# Patient Record
Sex: FEMALE | Race: WHITE | NOT HISPANIC OR LATINO | Employment: FULL TIME | URBAN - NONMETROPOLITAN AREA
[De-identification: names, ages, dates, MRNs, and addresses within clinical notes are randomized per-mention and may not be internally consistent; named-entity substitution may affect disease eponyms.]

---

## 2018-11-02 ENCOUNTER — OFFICE VISIT (OUTPATIENT)
Dept: FAMILY MEDICINE CLINIC | Facility: CLINIC | Age: 48
End: 2018-11-02

## 2018-11-02 VITALS
OXYGEN SATURATION: 98 % | SYSTOLIC BLOOD PRESSURE: 110 MMHG | HEIGHT: 70 IN | TEMPERATURE: 98.1 F | WEIGHT: 167 LBS | BODY MASS INDEX: 23.91 KG/M2 | DIASTOLIC BLOOD PRESSURE: 76 MMHG | HEART RATE: 79 BPM

## 2018-11-02 DIAGNOSIS — R14.2 BELCHING: Primary | ICD-10-CM

## 2018-11-02 DIAGNOSIS — R82.90 ABNORMAL URINALYSIS: ICD-10-CM

## 2018-11-02 DIAGNOSIS — K21.9 GASTROESOPHAGEAL REFLUX DISEASE, ESOPHAGITIS PRESENCE NOT SPECIFIED: ICD-10-CM

## 2018-11-02 DIAGNOSIS — R14.0 ABDOMINAL BLOATING: ICD-10-CM

## 2018-11-02 DIAGNOSIS — R21 RASH: ICD-10-CM

## 2018-11-02 LAB
B-HCG UR QL: NEGATIVE
BILIRUB BLD-MCNC: NEGATIVE MG/DL
CLARITY, POC: ABNORMAL
COLOR UR: ABNORMAL
GLUCOSE UR STRIP-MCNC: NEGATIVE MG/DL
INTERNAL NEGATIVE CONTROL: NEGATIVE
INTERNAL POSITIVE CONTROL: POSITIVE
KETONES UR QL: NEGATIVE
LEUKOCYTE EST, POC: ABNORMAL
Lab: NORMAL
NITRITE UR-MCNC: NEGATIVE MG/ML
PH UR: 7 [PH] (ref 5–8)
PROT UR STRIP-MCNC: NEGATIVE MG/DL
RBC # UR STRIP: NEGATIVE /UL
SP GR UR: 1.01 (ref 1–1.03)
UROBILINOGEN UR QL: NORMAL

## 2018-11-02 PROCEDURE — 99213 OFFICE O/P EST LOW 20 MIN: CPT | Performed by: PHYSICIAN ASSISTANT

## 2018-11-02 PROCEDURE — 81025 URINE PREGNANCY TEST: CPT | Performed by: PHYSICIAN ASSISTANT

## 2018-11-02 PROCEDURE — 81003 URINALYSIS AUTO W/O SCOPE: CPT | Performed by: PHYSICIAN ASSISTANT

## 2018-11-02 NOTE — PROGRESS NOTES
"Subjective   Mimi TANG is a 48 y.o. female. Patient complaining of rash on right arm, GERD and  increased burping and feels bloated     History of Present Illness     HAS HAD STRESS THAT HAS BEEN INCREASED FOR 6 MONTHS. STATES SHE HAS STRESS WITH WORRYING ABOUT HIM AND LIFE STRESSORS.     STATES HAS HAD INCREASED BELCHING AND BLOATING AND NERVOUS STOMACH FEELING X 2-3 WEEKS. WHEN HAS BLOATING, IF DOESN'T RELIEVE WITH GAS OR BELCHING, FEELS LIKE PRESSURE ON BLADDER. USUALLY BELCHING NOT LOWER ABDOMINAL GAS. STATES LESS BM WITH NOT EATING AS MUCH WITH STRESS. OTHERWISE, NO DIARRHEA OR CONSTIPATION. NO BLOOD IN STOOL. NO VOMITING. ETOH- DRINKS BEER- WEEKENDS- GOES THROUGH 12-24 TOTAL FOR THE WEEKEND. PREVIOUSLY WAS DRINKING ON WEEKDAYS. WAS DRINKING 2-3 EVERY NIGHT. STOPPED DRINKING DURING THE WEEK ABOUT 1 MONTH AGO.     HAD \"AN INNY\" UMBILICUS AND NOW HAS \"AN OUTY\". STATES FEELING STUFF IN THAT AREA. HAPPENED 2 YEARS AGO THAT IT CHANGED. NO FACTORS OR GI ISSUES OR SURGERIES BEFORE.     NO FHX COLON CA. MGM WITH BREAST CA- DX IN 50S. NO OTHER BREAST, COLON, OVARIAN, OR UTERINE CA.     NOTICED AN AREA ON RIGHT ARM TODAY THAT SHE DOES NOT THINK COULD HAVE BEEN THERE MORE THAN A FEW DAYS. NO ITCHING OR BURNING OR PAIN. NO ERYTHEMA. STATES HAS AND DRIER SKIN LATELY AND NEEDED MORE LOTION.     The following portions of the patient's history were reviewed and updated as appropriate: allergies, current medications, past family history, past medical history, past social history, past surgical history and problem list.    Review of Systems   Gastrointestinal:        GERD, increased burping, bloating    Skin: Positive for rash (on right arm ).   All other systems reviewed and are negative.      Objective   Physical Exam   Constitutional: She is oriented to person, place, and time. She appears well-developed and well-nourished.   HENT:   Head: Normocephalic and atraumatic.   Right Ear: External ear normal.   Left Ear: " External ear normal.   Nose: Nose normal.   Eyes: Conjunctivae are normal.   Cardiovascular: Normal rate, regular rhythm, normal heart sounds and intact distal pulses.  Exam reveals no gallop and no friction rub.    No murmur heard.  Pulmonary/Chest: Effort normal and breath sounds normal. No respiratory distress. She has no wheezes. She has no rhonchi. She has no rales.   Abdominal: Soft. Normal appearance and bowel sounds are normal. She exhibits no shifting dullness, no distension, no abdominal bruit and no mass. There is no hepatosplenomegaly. There is tenderness (very mild). There is no rigidity, no rebound, no guarding, no CVA tenderness and negative Powell's sign. Hernia: UMBILICAL.   Neurological: She is alert and oriented to person, place, and time.   Skin: Skin is warm and dry. She is not diaphoretic.   Psychiatric: She has a normal mood and affect. Her behavior is normal. Judgment and thought content normal.   Nursing note and vitals reviewed.      Assessment/Plan   Mimi was seen today for gerd and rash.    Diagnoses and all orders for this visit:    Belching  -     CBC & Differential  -     Comprehensive Metabolic Panel  -     Amylase  -     Lipase  -     Hepatitis Panel, Acute  -     POC Urinalysis Dipstick, Automated  -     Thyroid Panel With TSH  -     CT Abdomen Pelvis Without Contrast  -     POC Pregnancy, Urine    Gastroesophageal reflux disease, esophagitis presence not specified  -     CBC & Differential  -     Comprehensive Metabolic Panel  -     Amylase  -     Lipase  -     Hepatitis Panel, Acute  -     POC Urinalysis Dipstick, Automated  -     Thyroid Panel With TSH  -     CT Abdomen Pelvis Without Contrast  -     POC Pregnancy, Urine    Abdominal bloating  -     CBC & Differential  -     Comprehensive Metabolic Panel  -     Amylase  -     Lipase  -     Hepatitis Panel, Acute  -     POC Urinalysis Dipstick, Automated  -     Thyroid Panel With TSH  -     CT Abdomen Pelvis Without Contrast  -      POC Pregnancy, Urine    Rash  -     betamethasone valerate (VALISONE) 0.1 % ointment; Apply  topically to the appropriate area as directed 2 (Two) Times a Day for 7 days.      Patient Instructions   48 YEAR OLD FEMALE WHO PRESENTS TODAY WITH 2-3 WEEKS OF FEELING LIKE SHE HAD A NERVOUS STOMACH THEN INCREASED BELCHING AND BLOATING AND LOWER ABDOMINAL PRESSURE.  SHE HAS HAD INCREASED STRESS IN THE PAST 6 MONTHS AND HAS HAD MORE MILD GI SYMPTOMS FOR ABOUT THE SAME. SHE IS  FROM HER . PATIENT DOES CONTINUE TO SMOKE, DRINK COFFEE, AND ETOH. PATIENT DENIES CONSTIPATION, DIARRHEA, OR VOMITING. NO BLOOD IN STOOL. SHE HAS NOTED AN UMBILICAL HERNIA OVER THE PAST 2 YEARS AS WELL. HERNIA IS REDUCIBLE ON EXAM. I WILL HAVE HER START ZANTAC 150 MG TWICE DAILY, CHECK LABS TODAY, AND REFER FOR CT ABD/PELVIS. CONSIDERATION OF US IF NEEDED. CONSIDERATION OF GI REFERRAL IF PERSISTENT SYMPTOMS WITH NORMAL IMAGING AND LABS.     PATIENT HAS ALSO NOTED RASH ON HER RIGHT ARM. I WILL HAVE HER USE BETAMETHASONE TWICE DAILY FOR UP TO 1-2 WEEKS. IF WORSENING, NEW OR CHANGING SYMPTOMS, TO CALL OR RETURN. IF WORSENING, TO BE SEEN.

## 2018-11-03 LAB
ALBUMIN SERPL-MCNC: 4.6 G/DL (ref 3.5–5.5)
ALBUMIN/GLOB SERPL: 1.8 {RATIO} (ref 1.2–2.2)
ALP SERPL-CCNC: 67 IU/L (ref 39–117)
ALT SERPL-CCNC: 10 IU/L (ref 0–32)
AMYLASE SERPL-CCNC: 51 U/L (ref 31–124)
AST SERPL-CCNC: 18 IU/L (ref 0–40)
BASOPHILS # BLD AUTO: 0 X10E3/UL (ref 0–0.2)
BASOPHILS NFR BLD AUTO: 0 %
BILIRUB SERPL-MCNC: 0.5 MG/DL (ref 0–1.2)
BUN SERPL-MCNC: 9 MG/DL (ref 6–24)
BUN/CREAT SERPL: 12 (ref 9–23)
CALCIUM SERPL-MCNC: 9.8 MG/DL (ref 8.7–10.2)
CHLORIDE SERPL-SCNC: 100 MMOL/L (ref 96–106)
CO2 SERPL-SCNC: 22 MMOL/L (ref 20–29)
CREAT SERPL-MCNC: 0.76 MG/DL (ref 0.57–1)
EOSINOPHIL # BLD AUTO: 0.1 X10E3/UL (ref 0–0.4)
EOSINOPHIL NFR BLD AUTO: 1 %
ERYTHROCYTE [DISTWIDTH] IN BLOOD BY AUTOMATED COUNT: 12.9 % (ref 12.3–15.4)
FT4I SERPL CALC-MCNC: 2.1 (ref 1.2–4.9)
GLOBULIN SER CALC-MCNC: 2.6 G/DL (ref 1.5–4.5)
GLUCOSE SERPL-MCNC: 79 MG/DL (ref 65–99)
HAV IGM SERPL QL IA: NEGATIVE
HBV CORE IGM SERPL QL IA: NEGATIVE
HBV SURFACE AG SERPL QL IA: NEGATIVE
HCT VFR BLD AUTO: 44.3 % (ref 34–46.6)
HCV AB S/CO SERPL IA: <0.1 S/CO RATIO (ref 0–0.9)
HGB BLD-MCNC: 15.4 G/DL (ref 11.1–15.9)
IMM GRANULOCYTES # BLD: 0 X10E3/UL (ref 0–0.1)
IMM GRANULOCYTES NFR BLD: 0 %
LIPASE SERPL-CCNC: 31 U/L (ref 14–72)
LYMPHOCYTES # BLD AUTO: 2.4 X10E3/UL (ref 0.7–3.1)
LYMPHOCYTES NFR BLD AUTO: 33 %
MCH RBC QN AUTO: 33.8 PG (ref 26.6–33)
MCHC RBC AUTO-ENTMCNC: 34.8 G/DL (ref 31.5–35.7)
MCV RBC AUTO: 97 FL (ref 79–97)
MONOCYTES # BLD AUTO: 0.4 X10E3/UL (ref 0.1–0.9)
MONOCYTES NFR BLD AUTO: 5 %
NEUTROPHILS # BLD AUTO: 4.3 X10E3/UL (ref 1.4–7)
NEUTROPHILS NFR BLD AUTO: 61 %
PLATELET # BLD AUTO: 266 X10E3/UL (ref 150–379)
POTASSIUM SERPL-SCNC: 3.7 MMOL/L (ref 3.5–5.2)
PROT SERPL-MCNC: 7.2 G/DL (ref 6–8.5)
RBC # BLD AUTO: 4.56 X10E6/UL (ref 3.77–5.28)
SODIUM SERPL-SCNC: 142 MMOL/L (ref 134–144)
T3RU NFR SERPL: 28 % (ref 24–39)
T4 SERPL-MCNC: 7.4 UG/DL (ref 4.5–12)
TSH SERPL DL<=0.005 MIU/L-ACNC: 1.77 UIU/ML (ref 0.45–4.5)
WBC # BLD AUTO: 7.2 X10E3/UL (ref 3.4–10.8)

## 2018-11-06 LAB
BACTERIA UR CULT: ABNORMAL
BACTERIA UR CULT: ABNORMAL
OTHER ANTIBIOTIC SUSC ISLT: ABNORMAL

## 2018-11-07 NOTE — PATIENT INSTRUCTIONS
48 YEAR OLD FEMALE WHO PRESENTS TODAY WITH 2-3 WEEKS OF FEELING LIKE SHE HAD A NERVOUS STOMACH THEN INCREASED BELCHING AND BLOATING AND LOWER ABDOMINAL PRESSURE.  SHE HAS HAD INCREASED STRESS IN THE PAST 6 MONTHS AND HAS HAD MORE MILD GI SYMPTOMS FOR ABOUT THE SAME. SHE IS  FROM HER . PATIENT DOES CONTINUE TO SMOKE, DRINK COFFEE, AND ETOH. PATIENT DENIES CONSTIPATION, DIARRHEA, OR VOMITING. NO BLOOD IN STOOL. SHE HAS NOTED AN UMBILICAL HERNIA OVER THE PAST 2 YEARS AS WELL. HERNIA IS REDUCIBLE ON EXAM. I WILL HAVE HER START ZANTAC 150 MG TWICE DAILY, CHECK LABS TODAY, AND REFER FOR CT ABD/PELVIS. CONSIDERATION OF US IF NEEDED. CONSIDERATION OF GI REFERRAL IF PERSISTENT SYMPTOMS WITH NORMAL IMAGING AND LABS.     PATIENT HAS ALSO NOTED RASH ON HER RIGHT ARM. I WILL HAVE HER USE BETAMETHASONE TWICE DAILY FOR UP TO 1-2 WEEKS. IF WORSENING, NEW OR CHANGING SYMPTOMS, TO CALL OR RETURN. IF WORSENING, TO BE SEEN.

## 2018-11-08 RX ORDER — SULFAMETHOXAZOLE AND TRIMETHOPRIM 800; 160 MG/1; MG/1
1 TABLET ORAL 2 TIMES DAILY
Qty: 20 TABLET | Refills: 0 | OUTPATIENT
Start: 2018-11-08 | End: 2019-08-04

## 2019-08-04 ENCOUNTER — HOSPITAL ENCOUNTER (EMERGENCY)
Facility: HOSPITAL | Age: 49
Discharge: HOME | End: 2019-08-04
Attending: EMERGENCY MEDICINE | Admitting: EMERGENCY MEDICINE

## 2019-08-04 VITALS
OXYGEN SATURATION: 100 % | BODY MASS INDEX: 24.34 KG/M2 | RESPIRATION RATE: 15 BRPM | DIASTOLIC BLOOD PRESSURE: 93 MMHG | HEART RATE: 83 BPM | WEIGHT: 170 LBS | TEMPERATURE: 98.8 F | SYSTOLIC BLOOD PRESSURE: 145 MMHG | HEIGHT: 70 IN

## 2019-08-04 DIAGNOSIS — S39.012A LUMBAR STRAIN, INITIAL ENCOUNTER: Primary | ICD-10-CM

## 2019-08-04 PROCEDURE — 96372 THER/PROPH/DIAG INJ SC/IM: CPT

## 2019-08-04 PROCEDURE — 99283 EMERGENCY DEPT VISIT LOW MDM: CPT

## 2019-08-04 PROCEDURE — 25010000002 KETOROLAC TROMETHAMINE PER 15 MG: Performed by: EMERGENCY MEDICINE

## 2019-08-04 RX ORDER — PREDNISONE 20 MG/1
40 TABLET ORAL DAILY
Qty: 6 TABLET | Refills: 0 | Status: SHIPPED | OUTPATIENT
Start: 2019-08-04 | End: 2019-08-07

## 2019-08-04 RX ORDER — KETOROLAC TROMETHAMINE 30 MG/ML
30 INJECTION, SOLUTION INTRAMUSCULAR; INTRAVENOUS ONCE
Status: COMPLETED | OUTPATIENT
Start: 2019-08-04 | End: 2019-08-04

## 2019-08-04 RX ORDER — HYDROCODONE BITARTRATE AND ACETAMINOPHEN 7.5; 325 MG/1; MG/1
1 TABLET ORAL ONCE
Status: COMPLETED | OUTPATIENT
Start: 2019-08-04 | End: 2019-08-04

## 2019-08-04 RX ORDER — IBUPROFEN 600 MG/1
600 TABLET ORAL EVERY 6 HOURS PRN
Qty: 24 TABLET | Refills: 0 | Status: SHIPPED | OUTPATIENT
Start: 2019-08-04

## 2019-08-04 RX ORDER — METAXALONE 800 MG/1
800 TABLET ORAL 3 TIMES DAILY PRN
Qty: 15 TABLET | Refills: 0 | Status: SHIPPED | OUTPATIENT
Start: 2019-08-04 | End: 2021-03-24

## 2019-08-04 RX ORDER — CYCLOBENZAPRINE HCL 10 MG
10 TABLET ORAL ONCE
Status: COMPLETED | OUTPATIENT
Start: 2019-08-04 | End: 2019-08-04

## 2019-08-04 RX ORDER — HYDROCODONE BITARTRATE AND ACETAMINOPHEN 5; 325 MG/1; MG/1
1 TABLET ORAL EVERY 6 HOURS PRN
Qty: 15 TABLET | Refills: 0 | Status: SHIPPED | OUTPATIENT
Start: 2019-08-04 | End: 2021-03-24

## 2019-08-04 RX ADMIN — CYCLOBENZAPRINE 10 MG: 10 TABLET, FILM COATED ORAL at 09:24

## 2019-08-04 RX ADMIN — HYDROCODONE BITARTRATE AND ACETAMINOPHEN 1 TABLET: 7.5; 325 TABLET ORAL at 09:24

## 2019-08-04 RX ADMIN — KETOROLAC TROMETHAMINE 30 MG: 30 INJECTION, SOLUTION INTRAMUSCULAR at 09:23

## 2019-08-04 NOTE — DISCHARGE INSTRUCTIONS
You are advised to follow closely with Dr Layton in 2-3 days for recheck, final results of lab work and imaging testing, and further testing/treatment as needed.    Heat and gentle stretching  No heavy lifting/repeated bending until cleared by your doctor    Please return to the emergency department immediately with chest pain different than usual for you, shortness of air, abdominal pain, persistent vomiting/fever, blood in emesis or stool, lightheadedness/fainting, problems with speech, new weakness/numbness, new incontinence, problems with vision,  or for worsening of symptoms or other concerns.

## 2019-08-04 NOTE — ED PROVIDER NOTES
" EMERGENCY DEPARTMENT ENCOUNTER    CHIEF COMPLAINT  Chief Complaint: back pain  History given by: patient  History limited by: none  Room Number: 15/15  PMD: Courtney Layton PA      HPI:  Pt is a 48 y.o. female who presents complaining of back pain that began yesterday morning after \"reaching too far.\" Pt states the pain was originally in her left lower back but radiates to the lower right side. Pt states pain is worse with deep breathing and movement. Pt states she has numbness to LLE down to below her knee that began this AM. Pt states that she has hx of back issues and chronic RLE numbness. Pt denies weakness, upper back pain, BUE pain, and incontinence. Pt is a smoker and has alcohol daily but denies withdraw sx. Pt had a Neurontin this AM from her  with relief. Family at bedside.       Duration:  One day  Onset: gradual  Timing: constant  Location: left lower back  Radiation: right lower back  Quality: pain  Intensity/Severity: moderate  Progression: unchanged  Associated Symptoms: numbness LLE  Aggravating Factors: deep breathing, movement  Alleviating Factors: none  Previous Episodes: hx of similar back issues and chronic RLE numbness  Treatment before arrival: Pt had one of her husbands Neurontin with relief.     PAST MEDICAL HISTORY  Active Ambulatory Problems     Diagnosis Date Noted   • Eyes swollen 06/23/2016   • Allergic reaction to chemical substance 06/23/2016     Resolved Ambulatory Problems     Diagnosis Date Noted   • No Resolved Ambulatory Problems     Past Medical History:   Diagnosis Date   • Hidradenitis axillaris        PAST SURGICAL HISTORY  Past Surgical History:   Procedure Laterality Date   • AXILLARY HIDRADENITIS EXCISION Left 10/5/2016    Procedure: LT AXILLARY HIDRADENITIS ;  Surgeon: Bjorn Quick MD;  Location[place] [facility] [place] [facility] [place]  Service:    • NO PAST SURGERIES         FAMILY HISTORY  Family History   Problem Relation Age of Onset   • Breast cancer Maternal Grandmother    • " Diabetes Maternal Grandmother    • Prostate cancer Paternal Grandfather        SOCIAL HISTORY  Social History     Socioeconomic History   • Marital status:      Spouse name: Not on file   • Number of children: Not on file   • Years of education: Not on file   • Highest education level: Not on file   Tobacco Use   • Smoking status: Current Every Day Smoker     Packs/day: 1.00     Years: 25.00     Pack years: 25.00     Types: Cigarettes   • Smokeless tobacco: Never Used   Substance and Sexual Activity   • Alcohol use: No     Alcohol/week: 3.0 - 3.6 oz     Types: 5 - 6 Standard drinks or equivalent per week   • Drug use: No       ALLERGIES  Patient has no known allergies.    REVIEW OF SYSTEMS  Review of Systems   Constitutional: Negative for chills and fever.   HENT: Negative for rhinorrhea and sore throat.    Eyes: Negative for visual disturbance.   Respiratory: Negative for cough and shortness of breath.    Cardiovascular: Negative for chest pain, palpitations and leg swelling.   Gastrointestinal: Negative for abdominal pain, diarrhea and vomiting.   Endocrine: Negative.    Genitourinary: Negative for decreased urine volume, dysuria and frequency.   Musculoskeletal: Positive for back pain (lower). Negative for neck pain.   Skin: Negative for rash.   Neurological: Positive for numbness (LLE). Negative for syncope and headaches.   Psychiatric/Behavioral: Negative.    All other systems reviewed and are negative.      PHYSICAL EXAM  ED Triage Vitals [08/04/19 0720]   Temp Heart Rate Resp BP SpO2   98.8 °F (37.1 °C) 83 15 -- 100 %      Temp src Heart Rate Source Patient Position BP Location FiO2 (%)   -- -- -- -- --       Physical Exam   Constitutional: She is oriented to person, place, and time.  Non-toxic appearance. She appears distressed (mild).   HENT:   Head: Normocephalic and atraumatic.   Eyes: EOM are normal.   Neck: Normal range of motion. Neck supple.   Cardiovascular: Normal rate, regular rhythm, normal  heart sounds and intact distal pulses.   No murmur heard.  Pulses:       Posterior tibial pulses are 2+ on the right side, and 2+ on the left side.   Pulmonary/Chest: Effort normal and breath sounds normal. No respiratory distress.   CTAB   Abdominal: Soft. Bowel sounds are normal. There is no tenderness. There is no rebound and no guarding.   Musculoskeletal: Normal range of motion. She exhibits no edema.        Lumbar back: She exhibits tenderness (bilateral paravertebral ) and spasm. She exhibits no bony tenderness.   Diffuse lower lumbar paravertebral tenderness to palpation   Neurological: She is alert and oriented to person, place, and time.   No positive SLR   Skin: Skin is warm and dry.   Psychiatric: Affect normal.   Nursing note and vitals reviewed.        PROCEDURES  Procedures      PROGRESS AND CONSULTS     0843-Rechecked pt who is resting with family at bedside. Informed pt that sx are likely due to muscle strain and that XRs are not indicated. Discussed the plan to discharge the pt home with prescriptions for advil, skelaxin, deltasone, and norco. I instructed the pt to not lift more than 10 lbs or repeat bending motion until cleared by PCP. Informed pt that I will pre-medicate her before discharge. Pt understands and agrees with the plan, all questions answered.    0901-Ordered norco, flexeril, and toradol for back pain. Efraín ordered.     Efraín [id number] reviewed. EFRAÍN query complete. Treatment plan to include limited course of prescribed  controlled substance. Risks including addiction, benefits, and alternatives presented to patient.       MDM  Number of Diagnoses or Management Options     Amount and/or Complexity of Data Reviewed  Decide to obtain previous medical records or to obtain history from someone other than the patient: yes           DIAGNOSIS  Final diagnoses:   Lumbar strain, initial encounter       DISPOSITION  DISCHARGE    Patient discharged in stable condition.    Reviewed  implications of results, diagnosis, meds, responsibility to follow up, warning signs and symptoms of possible worsening, potential complications and reasons to return to ER.    Patient/Family voiced understanding of above instructions.    Discussed plan for discharge, as there is no emergent indication for admission. Patient referred to primary care provider for BP management due to today's BP. Pt/family is agreeable and understands need for follow up and repeat testing.  Pt is aware that discharge does not mean that nothing is wrong but it indicates no emergency is present that requires admission and they must continue care with follow-up as given below or physician of their choice.     FOLLOW-UP  Courtney Layton PA  [address] [address]  [phone]    Schedule an appointment as soon as possible for a visit in 3 days  EVEN IF WELL         Medication List      New Prescriptions    HYDROcodone-acetaminophen 5-325 MG per tablet  Commonly known as:  NORCO  Take 1 tablet by mouth Every 6 (Six) Hours As Needed for Moderate Pain .     ibuprofen 600 MG tablet  Commonly known as:  ADVIL,MOTRIN  Take 1 tablet by mouth Every 6 (Six) Hours As Needed for Mild Pain  or   Moderate Pain  (take with food).     metaxalone 800 MG tablet  Commonly known as:  SKELAXIN  Take 1 tablet by mouth 3 (Three) Times a Day As Needed for Muscle Spasms.     predniSONE 20 MG tablet  Commonly known as:  DELTASONE  Take 2 tablets by mouth Daily for 3 days.        Stop    sulfamethoxazole-trimethoprim 800-160 MG per tablet  Commonly known as:  BACTRIM DS              Latest Documented Vital Signs:  As of 9:12 AM  BP- 145/93 HR- 83 Temp- 98.8 °F (37.1 °C) O2 sat- 100%    --  Documentation assistance provided by vincent Mtz for MD Teresita.  Information recorded by the vincent was done at my direction and has been verified and validated by me.     Jamila Mtz  08/04/19 0912       Deborah Membreno MD  08/07/19  1813

## 2021-03-24 ENCOUNTER — HOSPITAL ENCOUNTER (OUTPATIENT)
Dept: GENERAL RADIOLOGY | Facility: HOSPITAL | Age: 51
Discharge: HOME | End: 2021-03-24
Admitting: PHYSICIAN ASSISTANT

## 2021-03-24 ENCOUNTER — OFFICE VISIT (OUTPATIENT)
Dept: FAMILY MEDICINE CLINIC | Facility: CLINIC | Age: 51
End: 2021-03-24

## 2021-03-24 VITALS
HEIGHT: 69 IN | DIASTOLIC BLOOD PRESSURE: 60 MMHG | SYSTOLIC BLOOD PRESSURE: 120 MMHG | BODY MASS INDEX: 28.23 KG/M2 | WEIGHT: 190.6 LBS | OXYGEN SATURATION: 98 % | RESPIRATION RATE: 20 BRPM | HEART RATE: 80 BPM | TEMPERATURE: 97.9 F

## 2021-03-24 DIAGNOSIS — J02.9 SORETHROAT: ICD-10-CM

## 2021-03-24 DIAGNOSIS — R50.9 CHILLS WITH FEVER: ICD-10-CM

## 2021-03-24 DIAGNOSIS — R50.9 FEVER, UNSPECIFIED FEVER CAUSE: Primary | ICD-10-CM

## 2021-03-24 DIAGNOSIS — Z20.822 SUSPECTED COVID-19 VIRUS INFECTION: ICD-10-CM

## 2021-03-24 LAB
EXPIRATION DATE: NORMAL
EXPIRATION DATE: NORMAL
FLUAV AG NPH QL: NEGATIVE
FLUBV AG NPH QL: NEGATIVE
INTERNAL CONTROL: NORMAL
INTERNAL CONTROL: NORMAL
Lab: 6636
Lab: NORMAL
S PYO AG THROAT QL: NEGATIVE

## 2021-03-24 PROCEDURE — 71046 X-RAY EXAM CHEST 2 VIEWS: CPT

## 2021-03-24 PROCEDURE — 87880 STREP A ASSAY W/OPTIC: CPT | Performed by: PHYSICIAN ASSISTANT

## 2021-03-24 PROCEDURE — 99213 OFFICE O/P EST LOW 20 MIN: CPT | Performed by: PHYSICIAN ASSISTANT

## 2021-03-24 PROCEDURE — 87804 INFLUENZA ASSAY W/OPTIC: CPT | Performed by: PHYSICIAN ASSISTANT

## 2021-03-24 RX ORDER — IVERMECTIN 3 MG/1
15 TABLET ORAL DAILY
Qty: 25 TABLET | Refills: 0 | Status: SHIPPED | OUTPATIENT
Start: 2021-03-24 | End: 2021-03-29

## 2021-03-24 RX ORDER — METHYLPREDNISOLONE 4 MG/1
TABLET ORAL
Qty: 21 TABLET | Refills: 0 | Status: SHIPPED | OUTPATIENT
Start: 2021-03-24

## 2021-03-24 RX ORDER — DOXYCYCLINE HYCLATE 100 MG/1
100 CAPSULE ORAL 2 TIMES DAILY
Qty: 20 CAPSULE | Refills: 0 | Status: SHIPPED | OUTPATIENT
Start: 2021-03-24

## 2021-03-24 NOTE — PROGRESS NOTES
Subjective   Mimi TANG is a 50 y.o. female who presents today for evaluation of cough, SOA, chills, fever x 3 days.     History of Present Illness     Started yesterday with cough, SOA with stairs, chills- subj fever, slight headache, body aches. Sore throat last night that improved.     No ear pain, V, D, loss of taste or smell, contacts with similar symptoms, or Covid 19+ contacts.     The following portions of the patient's history were reviewed and updated as appropriate: allergies, current medications, past family history, past medical history, past social history, past surgical history and problem list.    Review of Systems   Constitutional: Positive for chills, diaphoresis, fatigue and fever.   HENT: Positive for congestion, sinus pressure, sinus pain and sore throat.    Respiratory: Positive for cough and shortness of breath.    Cardiovascular: Negative.    Musculoskeletal: Positive for arthralgias and myalgias.   Neurological: Positive for headaches.       Objective   Vitals:    03/24/21 1454   BP: 120/60   Pulse: 80   Resp: 20   Temp: 97.9 °F (36.6 °C)   SpO2: 98%     Body mass index is 28.15 kg/m².    Physical Exam  Vitals and nursing note reviewed.   Constitutional:       General: She is not in acute distress.     Appearance: Normal appearance. She is well-developed.   HENT:      Head: Normocephalic and atraumatic.      Right Ear: Tympanic membrane, ear canal and external ear normal.      Left Ear: Tympanic membrane, ear canal and external ear normal.      Nose: Nose normal.      Mouth/Throat:      Pharynx: Uvula midline.   Eyes:      General: Lids are normal.      Conjunctiva/sclera: Conjunctivae normal.   Neck:      Vascular: No carotid bruit.   Cardiovascular:      Rate and Rhythm: Normal rate and regular rhythm.      Heart sounds: Normal heart sounds. No murmur heard.   No friction rub. No gallop.    Pulmonary:      Effort: Pulmonary effort is normal. No respiratory distress.      Breath sounds:  Wheezing (trace end expiratory wheezing) and rales (vs coarse BS left lower lobe) present. No rhonchi.   Musculoskeletal:         General: No deformity.      Cervical back: Neck supple.   Skin:     General: Skin is warm and dry.   Neurological:      Mental Status: She is alert and oriented to person, place, and time.      Gait: Gait normal.   Psychiatric:         Speech: Speech normal.         Behavior: Behavior normal.         Thought Content: Thought content normal.         Judgment: Judgment normal.         Assessment/Plan   Diagnoses and all orders for this visit:    1. Fever, unspecified fever cause (Primary)  -     POCT Influenza A/B  -     POCT rapid strep A  -     COVID-19,[facility] ROUTINE, NP/OP SWAB IN TRANSPORT MEDIA OR ESWAB 72 HR TAT - Swab, Nasopharynx  -     ivermectin (STROMECTOL) 3 MG tablet tablet; Take 5 tablets by mouth Daily for 5 days.  Dispense: 25 tablet; Refill: 0  -     doxycycline (VIBRAMYCIN) 100 MG capsule; Take 1 capsule by mouth 2 (Two) Times a Day.  Dispense: 20 capsule; Refill: 0  -     methylPREDNISolone (Medrol) 4 MG dose pack; follow package directions  Dispense: 21 tablet; Refill: 0  -     XR Chest PA & Lateral    2. Chills with fever  -     POCT Influenza A/B  -     POCT rapid strep A  -     COVID-19,[facility] ROUTINE, NP/OP SWAB IN TRANSPORT MEDIA OR ESWAB 72 HR TAT - Swab, Nasopharynx  -     ivermectin (STROMECTOL) 3 MG tablet tablet; Take 5 tablets by mouth Daily for 5 days.  Dispense: 25 tablet; Refill: 0  -     doxycycline (VIBRAMYCIN) 100 MG capsule; Take 1 capsule by mouth 2 (Two) Times a Day.  Dispense: 20 capsule; Refill: 0  -     methylPREDNISolone (Medrol) 4 MG dose pack; follow package directions  Dispense: 21 tablet; Refill: 0  -     XR Chest PA & Lateral    3. Sorethroat  -     POCT rapid strep A    4. Suspected COVID-19 virus infection  -     ivermectin (STROMECTOL) 3 MG tablet tablet; Take 5 tablets by mouth Daily for 5 days.  Dispense: 25 tablet; Refill: 0  -      doxycycline (VIBRAMYCIN) 100 MG capsule; Take 1 capsule by mouth 2 (Two) Times a Day.  Dispense: 20 capsule; Refill: 0  -     methylPREDNISolone (Medrol) 4 MG dose pack; follow package directions  Dispense: 21 tablet; Refill: 0  -     XR Chest PA & Lateral    Other orders  -     SARS-CoV-2, [facility] DANIEL 2 DAY TAT - ,        Assessment and Plan  Patient with 3 day history of illness with subjective fever, chills, body aches, cough, soa, wheezing, sinus pressure/ headache, and sore throat. Symptoms are very concerning for Covid 19 infection. I will check Covid 19 testing and have her get CXR ASAP. If negative Covid 19 testing, she will need to recheck Covid 19 testing next week. I consulted Dr Castanon and will give treatment with Medrol Dose pack, Doxycycline 100 mg twice daily for 10 days, Ivermectin daily for 5 days, Zinc, vitamin D, vitamin C, aspirin 325 mg once daily, and melatonin 10 mg at bedtime per protocol. To be seen here or ER ASAP if worsening, new, or changing symptoms, especially if persistent or worsening respiratory or systemic symptoms.     I spent 35 minutes cariting for Mimi TANG on this date of service. This time includes time spent by me in the following activities as necessary: preparing for the visit, reviewing tests, specialists records and previous visits, obtaining and/or reviewing a separately obtained history, performing a medically appropriate exam and/or evaluation, counseling and educating the patient, family, garegiver, referring and/or communicating with other healthcare professionals, documenting information in the medical record, independently interpreting results and communicating that information with the patient, family, caregiver, and developing a medically appropriate treatment plan with consideration of other conditions, medications, and treatments.

## 2021-03-25 LAB
LABCORP SARS-COV-2, NAA 2 DAY TAT: NORMAL
SARS-COV-2 RNA RESP QL NAA+PROBE: NOT DETECTED

## 2021-03-26 ENCOUNTER — TELEPHONE (OUTPATIENT)
Dept: FAMILY MEDICINE CLINIC | Facility: CLINIC | Age: 51
End: 2021-03-26